# Patient Record
Sex: MALE | Race: WHITE | URBAN - METROPOLITAN AREA
[De-identification: names, ages, dates, MRNs, and addresses within clinical notes are randomized per-mention and may not be internally consistent; named-entity substitution may affect disease eponyms.]

---

## 2020-10-12 ENCOUNTER — OFFICE VISIT (OUTPATIENT)
Dept: URBAN - METROPOLITAN AREA CLINIC 41 | Facility: CLINIC | Age: 75
End: 2020-10-12
Payer: COMMERCIAL

## 2020-10-12 PROCEDURE — 67028 INJECTION EYE DRUG: CPT | Performed by: OPHTHALMOLOGY

## 2020-10-12 PROCEDURE — 92134 CPTRZ OPH DX IMG PST SGM RTA: CPT | Performed by: OPHTHALMOLOGY

## 2020-10-12 PROCEDURE — 92012 INTRM OPH EXAM EST PATIENT: CPT | Performed by: OPHTHALMOLOGY

## 2020-10-12 ASSESSMENT — INTRAOCULAR PRESSURE
OD: 13
OS: 15

## 2020-10-12 NOTE — IMPRESSION/PLAN
Impression: Vitreous degeneration, left eye: H43.812. Left. Status: Symptomatic. Plan: Patient notes floaters (black dots in his vision); these have improved on exam today. Exam today demonstrates syneresis without any obvious RT or RD; OCT demonstrates no full PVD at this point. RDW discussed. Will follow.

## 2020-10-12 NOTE — IMPRESSION/PLAN
Impression: Exdtve age-rel mclr degn, right eye, with actv chrdl neovas: H35.3211. OD. Status: Symptomatic.
-naive on 08/01/18
-s/p Eylea last OD 09/18/2020 Plan: Exam and OCT reveal PED with resolved SRF and IRF after treatment of Eylea . Recommend treatment OD in 1 wk and will plan to treat OS today. R/B/A's discussed, patient elects to proceed. observation today OD, plan for injection in 1 wk RTC: 1 weeks straight Eylea OD, then 3 wks later for re-eval Eylea OS.

## 2020-10-12 NOTE — IMPRESSION/PLAN
Impression: Exdtve age-rel mclr degn, left eye, with actv chrdl neovas: H35.3221. OS. Status: Symptomatic.
s/p Avastin last 04/05/17 (no improvement in Va or SRF) s/p Eylea last 09/09/2020 Plan: Exam and OCT reveal PED with persistent IRF today. I have recommended continuing anti-VEGF treatment q4-5 weeks to maintain vision. If there continues to be persistent IRF and worsening vision, may consider q 2 wk tx. Intravitreal Eylea injected OS today without complication. RTC 1 wk straight Eylea OD, then 3 wks later for re-eval Eylea OS with OCT OU.

## 2020-10-12 NOTE — IMPRESSION/PLAN
Impression: Age-related nuclear cataract, bilateral: H25.13 OU. Status: Asymptomatic. Plan: Patient notes glare and trouble with lights, though this remains mild. There is mild progression of cataract OS. Observe closely for now.

## 2020-11-30 ENCOUNTER — OFFICE VISIT (OUTPATIENT)
Dept: URBAN - METROPOLITAN AREA CLINIC 41 | Facility: CLINIC | Age: 75
End: 2020-11-30
Payer: COMMERCIAL

## 2020-11-30 DIAGNOSIS — H25.13 AGE-RELATED NUCLEAR CATARACT, BILATERAL: ICD-10-CM

## 2020-11-30 PROCEDURE — 92134 CPTRZ OPH DX IMG PST SGM RTA: CPT | Performed by: OPHTHALMOLOGY

## 2020-11-30 PROCEDURE — 99214 OFFICE O/P EST MOD 30 MIN: CPT | Performed by: OPHTHALMOLOGY

## 2020-11-30 PROCEDURE — 67028 INJECTION EYE DRUG: CPT | Performed by: OPHTHALMOLOGY

## 2020-11-30 ASSESSMENT — INTRAOCULAR PRESSURE
OD: 19
OS: 16

## 2020-11-30 NOTE — IMPRESSION/PLAN
Impression: Exudative age-rel mclr degn, bi, with actv chrdl neovas: H35.3231. Bilateral.
-naive OD on 08/01/18
-s/p Eylea last OD 09/18/2020
-s/p Avastin OS last 04/05/17 (no improvement in Va or SRF) -s/p Eylea OS last 09/09/2020 Plan: OD Exam and OCT reveal PED with resolved SRF and IRF after treatment of Eylea . Recommend treatment OD in 1 wk and will plan to treat OS today. R/B/A's discussed, patient elects to proceed. observation today OD, plan for injection in 1 wk RTC: 1 weeks straight Eylea OD, then 3 wks later for re-eval Eylea OS. OS Exam and OCT reveal PED with worse SRF and persistent IRF today. I have recommended continuing anti-VEGF treatment q4-5 weeks to maintain vision. If there continues to be persistent IRF and worsening vision, may consider q 2 wk tx. Intravitreal Eylea injected OS today without complication. RTC 1 wk straight Eylea OD, then 3 wks later for re-eval Eylea OS with OCT OU.

## 2020-11-30 NOTE — IMPRESSION/PLAN
Impression: Vitreous degeneration, left eye: H43.812. Plan: Patient presents with new flashes/floaters. Exam demonstrates an acute PVD. Indirect ophthalmoscopy with scleral depression was performed and no retinal breaks or evidence of detachment were identified. The diagnosis, natural history, and prognosis of PVD were discussed at length. The signs and symptoms of retinal break and/or detachment including increased flashes, new-onset floaters, and development of a shadow/curtain shade in the visual field were reviewed. The patient was educated to call immediately with any new symptoms.

## 2020-11-30 NOTE — IMPRESSION/PLAN
Impression: Age-related nuclear cataract, bilateral: H25.13 OU. Status: Asymptomatic. Plan: Patient notes worsening glare and trouble with lights, though this remains mild. There is continued mild progression of cataract OS. Observe closely for now until AMD is better controlled.

## 2020-12-07 ENCOUNTER — PROCEDURE (OUTPATIENT)
Dept: URBAN - METROPOLITAN AREA CLINIC 41 | Facility: CLINIC | Age: 75
End: 2020-12-07
Payer: COMMERCIAL

## 2020-12-07 PROCEDURE — 67028 INJECTION EYE DRUG: CPT | Performed by: OPHTHALMOLOGY

## 2020-12-07 ASSESSMENT — INTRAOCULAR PRESSURE
OS: 26
OD: 19

## 2020-12-30 ENCOUNTER — OFFICE VISIT (OUTPATIENT)
Dept: URBAN - METROPOLITAN AREA CLINIC 41 | Facility: CLINIC | Age: 75
End: 2020-12-30
Payer: COMMERCIAL

## 2020-12-30 PROCEDURE — 92134 CPTRZ OPH DX IMG PST SGM RTA: CPT | Performed by: OPHTHALMOLOGY

## 2020-12-30 PROCEDURE — 67028 INJECTION EYE DRUG: CPT | Performed by: OPHTHALMOLOGY

## 2020-12-30 PROCEDURE — 92012 INTRM OPH EXAM EST PATIENT: CPT | Performed by: OPHTHALMOLOGY

## 2020-12-30 ASSESSMENT — INTRAOCULAR PRESSURE
OD: 10
OS: 13

## 2020-12-30 NOTE — IMPRESSION/PLAN
Impression: Exudative age-rel mclr degn, bi, with actv chrdl neovas: H35.3231. Bilateral.
-naive OD on 08/01/18
-s/p Eylea last OD 09/18/2020
-s/p Avastin OS last 04/05/17 (no improvement in Va or SRF) -s/p Eylea OS last 09/09/2020 Plan: OD Exam and OCT reveal PED with resolved SRF and IRF after treatment of Eylea . Recommend treatment OD in 1 wk and will plan to treat OS today. R/B/A's discussed, patient elects to proceed. observation today OD, plan for injection in 1 wk RTC: 1 weeks straight Eylea OD, then 1 wks later for re-eval Avastin OS with OCT. OS Exam and OCT reveal PED with SRF and persistent IRF today. I have recommended continuing anti-VEGF treatment q4-5 weeks to maintain vision. Since there continues to be persistent IRF and worsening vision, recommend q 2 wk tx. Intravitreal Eylea injected OS today without complication. RTC 1 wk straight Eylea OD, then 1 wks later for re-eval Avastin OS with OCT OU.

## 2020-12-30 NOTE — IMPRESSION/PLAN
Impression: Vitreous degeneration, left eye: H43.812.
- acute 11/30/2020 Plan: Patient presents with improving flashes/floaters. Exam demonstrates an PVD without any new RT or RD. The signs and symptoms of retinal break and/or detachment including increased flashes, new-onset floaters, and development of a shadow/curtain shade in the visual field were reviewed. The patient was educated to call immediately with any new symptoms.

## 2021-02-08 ENCOUNTER — PROCEDURE (OUTPATIENT)
Dept: URBAN - METROPOLITAN AREA CLINIC 41 | Facility: CLINIC | Age: 76
End: 2021-02-08
Payer: COMMERCIAL

## 2021-02-08 PROCEDURE — 92134 CPTRZ OPH DX IMG PST SGM RTA: CPT | Performed by: OPHTHALMOLOGY

## 2021-02-08 PROCEDURE — 67028 INJECTION EYE DRUG: CPT | Performed by: OPHTHALMOLOGY

## 2021-02-08 ASSESSMENT — INTRAOCULAR PRESSURE
OS: 9
OD: 11

## 2021-02-19 ENCOUNTER — PROCEDURE (OUTPATIENT)
Dept: URBAN - METROPOLITAN AREA CLINIC 41 | Facility: CLINIC | Age: 76
End: 2021-02-19
Payer: COMMERCIAL

## 2021-02-19 DIAGNOSIS — H35.3211 EXUDATIVE AGE-RELATED MACULAR DEGENERATION, RIGHT EYE, WITH ACTIVE CHOROIDAL NEOVASCULARIZATION: Primary | ICD-10-CM

## 2021-02-19 PROCEDURE — 67028 INJECTION EYE DRUG: CPT | Performed by: OPHTHALMOLOGY

## 2021-02-19 ASSESSMENT — INTRAOCULAR PRESSURE
OS: 15
OD: 16

## 2021-03-08 ENCOUNTER — OFFICE VISIT (OUTPATIENT)
Dept: URBAN - METROPOLITAN AREA CLINIC 41 | Facility: CLINIC | Age: 76
End: 2021-03-08
Payer: COMMERCIAL

## 2021-03-08 DIAGNOSIS — H35.3221 EXUDATIVE AGE-RELATED MACULAR DEGENERATION, LEFT EYE, WITH ACTIVE CHOROIDAL NEOVASCULARIZATION: ICD-10-CM

## 2021-03-08 DIAGNOSIS — H35.3231 EXUDATIVE AGE-RELATED MACULAR DEGENERATION, BILATERAL, WITH ACTIVE CHOROIDAL NEOVASCULARIZATION: Primary | ICD-10-CM

## 2021-03-08 PROCEDURE — 92012 INTRM OPH EXAM EST PATIENT: CPT | Performed by: OPHTHALMOLOGY

## 2021-03-08 PROCEDURE — 92134 CPTRZ OPH DX IMG PST SGM RTA: CPT | Performed by: OPHTHALMOLOGY

## 2021-03-08 PROCEDURE — 67028 INJECTION EYE DRUG: CPT | Performed by: OPHTHALMOLOGY

## 2021-03-08 ASSESSMENT — INTRAOCULAR PRESSURE
OS: 15
OD: 14

## 2021-03-08 NOTE — IMPRESSION/PLAN
Impression: Exudative age-rel mclr degn, bi, with actv chrdl neovas: H35.3231. Bilateral.
-naive OD on 08/01/18
-s/p Eylea last OD 02/19/21
-s/p Avastin OS last 04/05/17 (no improvement in Va or SRF) -s/p Eylea OS last 02/08/21 Plan: OD Exam and OCT reveal PED with resolved SRF and IRF after treatment of Eylea . Recommend treatment OD in 1 wk and will plan to treat OS today. R/B/A's discussed, patient elects to proceed. observation today OD, plan for injection in 1 wk OS Exam and OCT reveal PED with SRF and persistent IRF today. I have recommended continuing anti-VEGF treatment q4-5 weeks to maintain vision. Since there continues to be persistent IRF and worsening vision, recommend q 2 wk tx. Intravitreal Eylea injected OS today without complication. RTC 1 wk straight Eylea OD, then 1 wks later for re-eval Avastin OS with OCT OU. No

## 2021-03-22 ENCOUNTER — PROCEDURE (OUTPATIENT)
Dept: URBAN - METROPOLITAN AREA CLINIC 41 | Facility: CLINIC | Age: 76
End: 2021-03-22
Payer: COMMERCIAL

## 2021-03-22 PROCEDURE — 67028 INJECTION EYE DRUG: CPT | Performed by: OPHTHALMOLOGY

## 2021-03-22 PROCEDURE — 92134 CPTRZ OPH DX IMG PST SGM RTA: CPT | Performed by: OPHTHALMOLOGY

## 2021-03-22 ASSESSMENT — INTRAOCULAR PRESSURE
OS: 16
OD: 18

## 2021-04-05 ENCOUNTER — OFFICE VISIT (OUTPATIENT)
Dept: URBAN - METROPOLITAN AREA CLINIC 41 | Facility: CLINIC | Age: 76
End: 2021-04-05
Payer: COMMERCIAL

## 2021-04-05 PROCEDURE — 67028 INJECTION EYE DRUG: CPT | Performed by: OPHTHALMOLOGY

## 2021-04-05 PROCEDURE — 92012 INTRM OPH EXAM EST PATIENT: CPT | Performed by: OPHTHALMOLOGY

## 2021-04-05 PROCEDURE — 92134 CPTRZ OPH DX IMG PST SGM RTA: CPT | Performed by: OPHTHALMOLOGY

## 2021-04-05 ASSESSMENT — INTRAOCULAR PRESSURE
OS: 15
OD: 16

## 2021-04-05 NOTE — IMPRESSION/PLAN
Impression: Age-related nuclear cataract, bilateral: H25.13 OU. Status: Asymptomatic. Plan: Patient notes problems reading with worsening glare and trouble with lights, though this remains mild. There is continued mild progression of cataract OU. Observe closely for now until AMD is better controlled.

## 2021-04-05 NOTE — IMPRESSION/PLAN
Impression: Exudative age-rel mclr degn, bi, with actv chrdl neovas: H35.3231. Bilateral.
-naive OD on 08/01/18
-s/p Avastin OS last 04/05/17 (no improvement in Va or SRF) -s/p Eylea  OD last 03/22/2021
-s/p Eylea OS last 03/18/2021 Plan: OD Exam and OCT reveal PED with resolved SRF and IRF after treatment of Eylea 3/8/2021 . Recommend treatment OD in 1 wk and will plan to treat OS today. R/B/A's discussed, patient elects to proceed. observation today OD, plan for injection in 1-2 wk OS: Exam and OCT reveal PED with SRF and persistent IRF today. I have recommended continuing anti-VEGF treatment q4-5 weeks to maintain vision. Intravitreal Eylea injected OS today without complication. RTC 1 wk straight Eylea OD, then 3 wks later for re-eval Eylea OS with OCT OU.

## 2021-04-30 ENCOUNTER — PROCEDURE (OUTPATIENT)
Dept: URBAN - METROPOLITAN AREA CLINIC 41 | Facility: CLINIC | Age: 76
End: 2021-04-30
Payer: COMMERCIAL

## 2021-04-30 PROCEDURE — 67028 INJECTION EYE DRUG: CPT | Performed by: OPHTHALMOLOGY

## 2021-04-30 PROCEDURE — 92134 CPTRZ OPH DX IMG PST SGM RTA: CPT | Performed by: OPHTHALMOLOGY

## 2021-04-30 ASSESSMENT — INTRAOCULAR PRESSURE
OD: 17
OS: 14

## 2021-05-24 ENCOUNTER — OFFICE VISIT (OUTPATIENT)
Dept: URBAN - METROPOLITAN AREA CLINIC 41 | Facility: CLINIC | Age: 76
End: 2021-05-24
Payer: COMMERCIAL

## 2021-05-24 PROCEDURE — 92012 INTRM OPH EXAM EST PATIENT: CPT | Performed by: OPHTHALMOLOGY

## 2021-05-24 PROCEDURE — 92134 CPTRZ OPH DX IMG PST SGM RTA: CPT | Performed by: OPHTHALMOLOGY

## 2021-05-24 PROCEDURE — 67028 INJECTION EYE DRUG: CPT | Performed by: OPHTHALMOLOGY

## 2021-05-24 ASSESSMENT — INTRAOCULAR PRESSURE
OD: 10
OS: 14

## 2021-05-24 NOTE — IMPRESSION/PLAN
Impression: Dry eye syndrome of bilateral lacrimal glands: H04.123. Plan: Patient notes blurring of vision. Exam demonstrates PEE. Discussed R/B/A of ATs, PFATs, Restasis, vs punctal plugs.  Rec ATs

## 2021-05-24 NOTE — IMPRESSION/PLAN
Impression: Exudative age-rel mclr degn, bi, with actv chrdl neovas: H35.3231. Bilateral.
-naive OD on 08/01/18
-s/p Avastin OS last 04/05/17 (no improvement in Va or SRF) -s/p Eylea  OD last 04/30/2021
-s/p Eylea OS last 04/05/2021 Plan: OD Exam and OCT reveal PED with resolved SRF and IRF. Recommend treatment OD in 1 wk (however, patient will be gone). Will plan to treat OS today. R/B/A's discussed, patient elects to observe. Warning symptoms discussed. observation today OD

OS: Exam and OCT reveal PED with SRF and persistent IRF today. I have recommended continuing anti-VEGF treatment q4-5 weeks to maintain vision. Intravitreal Eylea injected OS today without complication. 

RTC 4-5 wks re-eval Eylea OS with OCT OU. (then likely OD 2 days later)

## 2021-07-23 ENCOUNTER — OFFICE VISIT (OUTPATIENT)
Dept: URBAN - METROPOLITAN AREA CLINIC 41 | Facility: CLINIC | Age: 76
End: 2021-07-23
Payer: COMMERCIAL

## 2021-07-23 DIAGNOSIS — H04.123 DRY EYE SYNDROME OF BILATERAL LACRIMAL GLANDS: ICD-10-CM

## 2021-07-23 DIAGNOSIS — H43.812 VITREOUS DEGENERATION, LEFT EYE: ICD-10-CM

## 2021-07-23 PROCEDURE — 92012 INTRM OPH EXAM EST PATIENT: CPT | Performed by: OPHTHALMOLOGY

## 2021-07-23 PROCEDURE — 92134 CPTRZ OPH DX IMG PST SGM RTA: CPT | Performed by: OPHTHALMOLOGY

## 2021-07-23 PROCEDURE — 67028 INJECTION EYE DRUG: CPT | Performed by: OPHTHALMOLOGY

## 2021-07-23 ASSESSMENT — INTRAOCULAR PRESSURE
OS: 18
OD: 21

## 2021-07-23 NOTE — IMPRESSION/PLAN
Impression: Exudative age-rel mclr degn, bi, with actv chrdl neovas: H35.3231. Bilateral.
-naive OD on 08/01/18
-s/p Avastin OS last 04/05/17 (no improvement in Va or SRF) -s/p Eylea  OD last 04/30/2021
-s/p Eylea OS last 04/05/2021 Plan: OD Exam and OCT reveal PED with slightly worse SRF and IRF after Eylea 3 mo ago. Recommend treatment OD in 1 wk. Will plan to treat OS today. R/B/A's discussed, patient elects to observe. Warning symptoms discussed. observation today OD

OS: Exam and OCT reveal PED with SRF and worse IRF today. I have recommended continuing anti-VEGF treatment q4-5 weeks to maintain vision. Intravitreal Eylea injected OS today without complication. RTC 1 wk straight Eylea OD, then 4-5 wks re-eval Eylea OS with OCT OU.  (May consider extending OD to q 8 wks)